# Patient Record
Sex: MALE | Race: WHITE | NOT HISPANIC OR LATINO | Employment: FULL TIME | URBAN - METROPOLITAN AREA
[De-identification: names, ages, dates, MRNs, and addresses within clinical notes are randomized per-mention and may not be internally consistent; named-entity substitution may affect disease eponyms.]

---

## 2018-02-24 ENCOUNTER — NURSE TRIAGE (OUTPATIENT)
Dept: NURSING | Facility: CLINIC | Age: 33
End: 2018-02-24

## 2018-02-24 ENCOUNTER — APPOINTMENT (OUTPATIENT)
Dept: GENERAL RADIOLOGY | Facility: CLINIC | Age: 33
End: 2018-02-24
Attending: PHYSICIAN ASSISTANT
Payer: COMMERCIAL

## 2018-02-24 ENCOUNTER — HOSPITAL ENCOUNTER (EMERGENCY)
Facility: CLINIC | Age: 33
Discharge: HOME | End: 2018-02-24
Attending: PHYSICIAN ASSISTANT | Admitting: PHYSICIAN ASSISTANT
Payer: COMMERCIAL

## 2018-02-24 VITALS
WEIGHT: 260 LBS | OXYGEN SATURATION: 97 % | HEART RATE: 91 BPM | TEMPERATURE: 97 F | BODY MASS INDEX: 35.21 KG/M2 | DIASTOLIC BLOOD PRESSURE: 89 MMHG | SYSTOLIC BLOOD PRESSURE: 153 MMHG | RESPIRATION RATE: 16 BRPM | HEIGHT: 72 IN
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
## 2018-02-24 DIAGNOSIS — S13.4XXA WHIPLASH INJURY TO NECK, INITIAL ENCOUNTER: ICD-10-CM

## 2018-02-24 PROCEDURE — 72040 X-RAY EXAM NECK SPINE 2-3 VW: CPT | Mod: TC

## 2018-02-24 PROCEDURE — 99283 EMERGENCY DEPT VISIT LOW MDM: CPT | Performed by: PHYSICIAN ASSISTANT

## 2018-02-24 PROCEDURE — 99283 EMERGENCY DEPT VISIT LOW MDM: CPT | Mod: Z6 | Performed by: PHYSICIAN ASSISTANT

## 2018-02-24 ASSESSMENT — ENCOUNTER SYMPTOMS
SHORTNESS OF BREATH: 0
NECK PAIN: 1
BACK PAIN: 0
NUMBNESS: 0
HEADACHES: 0
WEAKNESS: 0
ABDOMINAL PAIN: 0
NECK STIFFNESS: 1

## 2018-02-24 NOTE — ED AVS SNAPSHOT
[facility] Emergency Department    [address] [place] [address]    Phone:  [phone]    Fax:  [phone]                                       Vicente Schmidt   MRN: [id number]    Department:  [facility] Emergency Department   Date of Visit:  2/24/2018           After Visit Summary Signature Page     I have received my discharge instructions, and my questions have been answered. I have discussed any challenges I see with this plan with the nurse or doctor.    ..........................................................................................................................................  Patient/Patient Representative Signature      ..........................................................................................................................................  Patient Representative Print Name and Relationship to Patient    ..................................................               ................................................  Date                                            Time    ..........................................................................................................................................  Reviewed by Signature/Title    ...................................................              ..............................................  Date                                                            Time
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
## 2018-02-27 ENCOUNTER — OFFICE VISIT (OUTPATIENT)
Dept: FAMILY MEDICINE | Facility: OTHER | Age: 33
End: 2018-02-27
Payer: COMMERCIAL

## 2018-02-27 VITALS
TEMPERATURE: 98.5 F | DIASTOLIC BLOOD PRESSURE: 76 MMHG | SYSTOLIC BLOOD PRESSURE: 138 MMHG | BODY MASS INDEX: 37.16 KG/M2 | WEIGHT: 274 LBS | HEART RATE: 88 BPM | RESPIRATION RATE: 16 BRPM
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
## 2018-02-27 DIAGNOSIS — S13.9XXD NECK SPRAIN, SUBSEQUENT ENCOUNTER: Primary | ICD-10-CM

## 2018-02-27 DIAGNOSIS — S46.811A STRAIN OF TRAPEZIUS MUSCLE, RIGHT, INITIAL ENCOUNTER: ICD-10-CM

## 2018-02-27 PROCEDURE — 99213 OFFICE O/P EST LOW 20 MIN: CPT | Performed by: PHYSICIAN ASSISTANT

## 2018-02-27 RX ORDER — CYCLOBENZAPRINE HCL 10 MG
10 TABLET ORAL
Qty: 30 TABLET | Refills: 0 | Status: SHIPPED | OUTPATIENT
Start: 2018-02-27 | End: 2018-05-07

## 2018-02-27 ASSESSMENT — PAIN SCALES - GENERAL: PAINLEVEL: MODERATE PAIN (5)
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
## 2018-02-27 NOTE — LETTER
February 27, 2018      Vicente Schmidt  [address] [place] [id number]        To Whom It May Concern:    Vicente Schmidt was seen on February 27, 2018 .  No work until recheck in 1 week.    Sincerely,        Yusra Bahena PA-C
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
## 2018-03-06 ENCOUNTER — OFFICE VISIT (OUTPATIENT)
Dept: FAMILY MEDICINE | Facility: OTHER | Age: 33
End: 2018-03-06
Payer: COMMERCIAL

## 2018-03-06 VITALS
RESPIRATION RATE: 16 BRPM | HEART RATE: 86 BPM | SYSTOLIC BLOOD PRESSURE: 132 MMHG | DIASTOLIC BLOOD PRESSURE: 80 MMHG | WEIGHT: 276.8 LBS | HEIGHT: 73 IN | BODY MASS INDEX: 36.68 KG/M2 | TEMPERATURE: 98.8 F

## 2018-03-06 DIAGNOSIS — S13.9XXD NECK SPRAIN, SUBSEQUENT ENCOUNTER: Primary | ICD-10-CM

## 2018-03-06 PROCEDURE — 99213 OFFICE O/P EST LOW 20 MIN: CPT | Performed by: PHYSICIAN ASSISTANT

## 2018-03-06 ASSESSMENT — PAIN SCALES - GENERAL: PAINLEVEL: MODERATE PAIN (4)
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
## 2018-03-06 NOTE — LETTER
March 6, 2018             Vicente Schmidt  [address] [address] [place] [phone]        To Whom It May Concern:    Vicente Schmidt was seen on March 6, 2018  .  No work until recheck in 1 week.    Sincerely,        Yusra Bahena PA-C

## 2018-03-13 ENCOUNTER — OFFICE VISIT (OUTPATIENT)
Dept: FAMILY MEDICINE | Facility: OTHER | Age: 33
End: 2018-03-13
Payer: COMMERCIAL

## 2018-03-13 VITALS
HEART RATE: 84 BPM | TEMPERATURE: 98.6 F | SYSTOLIC BLOOD PRESSURE: 130 MMHG | RESPIRATION RATE: 16 BRPM | BODY MASS INDEX: 36.58 KG/M2 | WEIGHT: 279 LBS | DIASTOLIC BLOOD PRESSURE: 70 MMHG

## 2018-03-13 DIAGNOSIS — S13.9XXD NECK SPRAIN, SUBSEQUENT ENCOUNTER: Primary | ICD-10-CM

## 2018-03-13 PROCEDURE — 99213 OFFICE O/P EST LOW 20 MIN: CPT | Performed by: PHYSICIAN ASSISTANT

## 2018-03-13 ASSESSMENT — PAIN SCALES - GENERAL: PAINLEVEL: MILD PAIN (2)
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
## 2018-03-28 ENCOUNTER — OFFICE VISIT (OUTPATIENT)
Dept: FAMILY MEDICINE | Facility: OTHER | Age: 33
End: 2018-03-28
Payer: COMMERCIAL

## 2018-03-28 VITALS
SYSTOLIC BLOOD PRESSURE: 126 MMHG | DIASTOLIC BLOOD PRESSURE: 74 MMHG | WEIGHT: 274 LBS | BODY MASS INDEX: 35.93 KG/M2 | TEMPERATURE: 98.8 F | RESPIRATION RATE: 16 BRPM | HEART RATE: 80 BPM

## 2018-03-28 DIAGNOSIS — S13.9XXD NECK SPRAIN, SUBSEQUENT ENCOUNTER: Primary | ICD-10-CM
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
## 2018-03-28 PROCEDURE — 99213 OFFICE O/P EST LOW 20 MIN: CPT | Performed by: PHYSICIAN ASSISTANT

## 2018-03-28 ASSESSMENT — PAIN SCALES - GENERAL: PAINLEVEL: MILD PAIN (3)

## 2018-03-28 NOTE — NURSING NOTE
Form given back to pt to return to HR department. Copy of form sent to scanning.    Joselin Choi CMA ([facility])
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
## 2018-04-09 ENCOUNTER — HOSPITAL ENCOUNTER (OUTPATIENT)
Dept: PHYSICAL THERAPY | Facility: OTHER | Age: 33
Setting detail: THERAPIES SERIES
End: 2018-04-09
Attending: PHYSICIAN ASSISTANT
Payer: COMMERCIAL

## 2018-04-09 PROCEDURE — 97530 THERAPEUTIC ACTIVITIES: CPT | Mod: GP | Performed by: PHYSICAL THERAPIST

## 2018-04-09 PROCEDURE — 40000718 ZZHC STATISTIC PT DEPARTMENT ORTHO VISIT: Performed by: PHYSICAL THERAPIST

## 2018-04-09 PROCEDURE — 97161 PT EVAL LOW COMPLEX 20 MIN: CPT | Mod: GP | Performed by: PHYSICAL THERAPIST

## 2018-04-09 PROCEDURE — 97110 THERAPEUTIC EXERCISES: CPT | Mod: GP | Performed by: PHYSICAL THERAPIST
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
## 2018-04-25 ENCOUNTER — OFFICE VISIT (OUTPATIENT)
Dept: FAMILY MEDICINE | Facility: OTHER | Age: 33
End: 2018-04-25
Payer: COMMERCIAL

## 2018-04-25 VITALS
HEART RATE: 76 BPM | DIASTOLIC BLOOD PRESSURE: 76 MMHG | RESPIRATION RATE: 16 BRPM | BODY MASS INDEX: 35.53 KG/M2 | SYSTOLIC BLOOD PRESSURE: 130 MMHG | TEMPERATURE: 99.5 F | WEIGHT: 271 LBS

## 2018-04-25 DIAGNOSIS — S13.9XXD NECK SPRAIN, SUBSEQUENT ENCOUNTER: Primary | ICD-10-CM

## 2018-04-25 PROCEDURE — 99213 OFFICE O/P EST LOW 20 MIN: CPT | Performed by: PHYSICIAN ASSISTANT

## 2018-04-25 ASSESSMENT — PAIN SCALES - GENERAL: PAINLEVEL: NO PAIN (0)

## 2018-04-25 NOTE — LETTER
April 25, 2018         Vicente Schmidt  [address] [address] [place] [phone]        To Whom It May Concern:    Vicente Schmidt was seen on April 25, 2018  .  He continues to improve as expected and symptoms are nearly resolved.  Restrictions are no longer necessary.        We will see him again in 2-4 weeks to ensure there has been complete resolution of his symptoms.      Sincerely,            Yusra Bahena PA-C
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
## 2018-05-07 ENCOUNTER — OFFICE VISIT (OUTPATIENT)
Dept: FAMILY MEDICINE | Facility: OTHER | Age: 33
End: 2018-05-07
Payer: COMMERCIAL

## 2018-05-07 ENCOUNTER — TELEPHONE (OUTPATIENT)
Dept: FAMILY MEDICINE | Facility: OTHER | Age: 33
End: 2018-05-07

## 2018-05-07 VITALS
WEIGHT: 270 LBS | DIASTOLIC BLOOD PRESSURE: 70 MMHG | SYSTOLIC BLOOD PRESSURE: 128 MMHG | HEART RATE: 80 BPM | BODY MASS INDEX: 35.4 KG/M2 | RESPIRATION RATE: 16 BRPM | TEMPERATURE: 98.5 F

## 2018-05-07 DIAGNOSIS — S13.9XXD NECK SPRAIN, SUBSEQUENT ENCOUNTER: Primary | ICD-10-CM

## 2018-05-07 PROCEDURE — 99212 OFFICE O/P EST SF 10 MIN: CPT | Performed by: PHYSICIAN ASSISTANT

## 2018-05-07 ASSESSMENT — PAIN SCALES - GENERAL: PAINLEVEL: NO PAIN (0)
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
## 2018-05-07 NOTE — MR AVS SNAPSHOT
"              After Visit Summary   2018    Vicente Schmidt    MRN: [id number]           Patient Information     Date Of Birth          1985        Visit Information        Provider Department      2018 4:00 PM Yusra Bahena PA-C [facility]         Follow-ups after your visit        Who to contact     If you have questions or need follow up information about today's clinic visit or your schedule please contact [facility] directly at [phone].  Normal or non-critical lab and imaging results will be communicated to you by MyChart, letter or phone within 4 business days after the clinic has received the results. If you do not hear from us within 7 days, please contact the clinic through [employer] or phone. If you have a critical or abnormal lab result, we will notify you by phone as soon as possible.  Submit refill requests through [employer] or call your pharmacy and they will forward the refill request to us. Please allow 3 business days for your refill to be completed.          Additional Information About Your Visit        [facility] Information     [employer] lets you send messages to your doctor, view your test results, renew your prescriptions, schedule appointments and more. To sign up, go to [email or web address] . Click on \"Log in\" on the left side of the screen, which will take you to the Welcome page. Then click on \"Sign up Now\" on the right side of the page.     You will be asked to enter the access code listed below, as well as some personal information. Please follow the directions to create your username and password.     Your access code is: [email or web address]  Expires: 2018  7:32 PM     Your access code will  in 90 days. If you need help or a new code, please call your [facility] or [phone].        Care EveryWhere ID     This is your Care EveryWhere ID. This could be used by other organizations to access your [place] medical " records  [id number]        Your Vitals Were     Pulse Temperature Respirations BMI (Body Mass Index)          80 98.5  F (36.9  C) (Temporal) 16 35.4 kg/m2         Blood Pressure from Last 3 Encounters:   05/07/18 128/70   04/25/18 130/76   03/28/18 126/74    Weight from Last 3 Encounters:   05/07/18 270 lb (122.5 kg)   04/25/18 271 lb (122.9 kg)   03/28/18 274 lb (124.3 kg)              Today, you had the following     No orders found for display       Primary Care Provider Office Phone # Fax #    Yusra Bahena PA-C [phone] [address] [place] [address]        Equal Access to Services     JOAQUÍN URBINA : Parmjit Singleton, waaxda luqadaha, qaybta kaalmada pawelyareinaldo, daniel hayward . So [facility] [phone].    ATENCIÓN: Si habla español, tiene a solis disposición servicios gratuitos de asistencia lingüística. Llame al [phone].    We comply with applicable federal civil rights laws and [place] laws. We do not discriminate on the basis of race, color, national origin, age, disability, sex, sexual orientation, or gender identity.            Thank you!     Thank you for choosing [facility]  for your care. Our goal is always to provide you with excellent care. Hearing back from our patients is one way we can continue to improve our services. Please take a few minutes to complete the written survey that you may receive in the mail after your visit with us. Thank you!             Your Updated Medication List - Protect others around you: Learn how to safely use, store and throw away your medicines at www.disposemymeds.org.      Notice  As of 5/7/2018  4:15 PM    You have not been prescribed any medications.

## 2018-05-07 NOTE — LETTER
May 7, 2018         Vicente Schmidt  [address] [address] [place] [id number]        To Whom It May Concern:    Vicente Schmidt was seen on May 7, 2018  . All pain has resolved. Restrictions are no longer necessary.   There is no need for further appointments as he has had resolution of all his pain.            Sincerely,            Yusra Bahena PA-C
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
## 2018-09-24 NOTE — ADDENDUM NOTE
Encounter addended by: Anat Rausch, PT on: 9/24/2018  8:40 AM<BR>     Actions taken: Episode resolved, Sign clinical note

## 2018-12-28 ENCOUNTER — OFFICE VISIT (OUTPATIENT)
Dept: FAMILY MEDICINE | Facility: CLINIC | Age: 33
End: 2018-12-28
Payer: COMMERCIAL

## 2018-12-28 VITALS
SYSTOLIC BLOOD PRESSURE: 134 MMHG | WEIGHT: 275 LBS | RESPIRATION RATE: 16 BRPM | DIASTOLIC BLOOD PRESSURE: 82 MMHG | HEART RATE: 73 BPM | BODY MASS INDEX: 36.06 KG/M2 | TEMPERATURE: 99.1 F | OXYGEN SATURATION: 99 %

## 2018-12-28 DIAGNOSIS — J06.9 UPPER RESPIRATORY TRACT INFECTION, UNSPECIFIED TYPE: ICD-10-CM

## 2018-12-28 DIAGNOSIS — J02.9 SORETHROAT: Primary | ICD-10-CM

## 2018-12-28 DIAGNOSIS — H11.32 SUBCONJUNCTIVAL HEMORRHAGE OF LEFT EYE: ICD-10-CM

## 2018-12-28 LAB
DEPRECATED S PYO AG THROAT QL EIA: NORMAL
SPECIMEN SOURCE: NORMAL

## 2018-12-28 PROCEDURE — 87880 STREP A ASSAY W/OPTIC: CPT | Performed by: PHYSICIAN ASSISTANT

## 2018-12-28 PROCEDURE — 99214 OFFICE O/P EST MOD 30 MIN: CPT | Performed by: PHYSICIAN ASSISTANT

## 2018-12-28 PROCEDURE — 87081 CULTURE SCREEN ONLY: CPT | Performed by: PHYSICIAN ASSISTANT

## 2018-12-28 RX ORDER — BENZONATATE 200 MG/1
200 CAPSULE ORAL 3 TIMES DAILY PRN
Qty: 30 CAPSULE | Refills: 0 | Status: SHIPPED | OUTPATIENT
Start: 2018-12-28 | End: 2019-05-13

## 2018-12-28 RX ORDER — CETIRIZINE HYDROCHLORIDE 10 MG/1
10 TABLET ORAL DAILY
Qty: 90 TABLET | Refills: 3 | Status: SHIPPED | OUTPATIENT
Start: 2018-12-28 | End: 2019-05-13

## 2018-12-28 ASSESSMENT — ENCOUNTER SYMPTOMS
HEMOPTYSIS: 0
CARDIOVASCULAR NEGATIVE: 1
DIAPHORESIS: 0
WHEEZING: 0
SHORTNESS OF BREATH: 0
PALPITATIONS: 0
COUGH: 1
GASTROINTESTINAL NEGATIVE: 1
BLURRED VISION: 0
SINUS PAIN: 0
CONSTITUTIONAL NEGATIVE: 1
DOUBLE VISION: 0
SORE THROAT: 1
WEIGHT LOSS: 0
SPUTUM PRODUCTION: 1
EYE PAIN: 0
PHOTOPHOBIA: 0
FEVER: 0
EYE REDNESS: 1
EYE DISCHARGE: 0

## 2018-12-28 ASSESSMENT — PAIN SCALES - GENERAL: PAINLEVEL: MODERATE PAIN (4)

## 2018-12-28 NOTE — LETTER
12/30/2018     Vicente Schmidt  [address] [address] [place] [phone]        Dear Vicente Laguerre,    Your 24 hour strep culture came back negative.  Please call the clinic at [phone] if you have any other questions or concerns.        Sincerely,    Samantha See KAPIL Lindsay

## 2018-12-28 NOTE — PROGRESS NOTES
"  SUBJECTIVE:   Vicente Schmidt is a 33 year old male who presents to clinic today for the following health issues:      Patient presents with:  Pharyngitis: pt c/o sore throat in the morning, post nasal drainage  Eye Problem: pt c/o red spot on left eye since 12/25/18        {additional problems for provider to add:656912}    Problem list and histories reviewed & adjusted, as indicated.  Additional history: {NONE - AS DOCUMENTED:874244::\"as documented\"}    {HIST REVIEW/ LINKS 2:351431}    Reviewed and updated as needed this visit by clinical staff  Tobacco  Allergies  Meds  Med Hx  Surg Hx  Fam Hx  Soc Hx      Reviewed and updated as needed this visit by Provider         {PROVIDER CHARTING PREFERENCE:545411}  "

## 2018-12-28 NOTE — PROGRESS NOTES
SUBJECTIVE:     HPI  Vicente Schmidt is a 33 year old male who presents to clinic today for the following health issues:  RESPIRATORY SYMPTOMS    Duration: 3days    Description  nasal congestion, rhinorrhea, sore throat, cough and L eye redness    Severity: mild    Accompanying signs and symptoms: Productive cough but no hemoptysis, shortness of breath or wheezing.  Reports ST and post nasal drainage that is worse in the morning but no sinus pain/pressure/HA.  No fever, chills or sweats.  No eye drainage or changes in his vision.  No HA, dizziness or n/v.  No trauma or injuries.     History (predisposing factors):  No ill contacts.  No pmh of asthma.  Non-smoker.    Precipitating or alleviating factors: None    Therapies tried and outcome:  rest and fluids and eye drops with some relief    Reviewed PMH, FMH and SOH.  Patient Active Problem List   Diagnosis     Pilonidal cyst with abscess     CARDIOVASCULAR SCREENING; LDL GOAL LESS THAN 160     No current outpatient medications on file.     No Known Allergies    Review of Systems   Constitutional: Negative.  Negative for diaphoresis, fever and weight loss.   HENT: Positive for congestion and sore throat. Negative for sinus pain.    Eyes: Positive for redness. Negative for blurred vision, double vision, photophobia, pain and discharge.   Respiratory: Positive for cough and sputum production. Negative for hemoptysis, shortness of breath and wheezing.    Cardiovascular: Negative.  Negative for chest pain and palpitations.   Gastrointestinal: Negative.    Skin: Negative.    All other systems reviewed and are negative.      /82   Pulse 73   Temp 99.1  F (37.3  C) (Oral)   Resp 16   Wt 124.7 kg (275 lb)   SpO2 99%   BMI 36.06 kg/m    Physical Exam   Constitutional: He is oriented to person, place, and time. He appears well-developed and well-nourished. No distress.   HENT:   Head: Normocephalic and atraumatic.   Nose: Mucosal edema and rhinorrhea present. No  nasal deformity or septal deviation. No epistaxis.  No foreign bodies. Right sinus exhibits no maxillary sinus tenderness and no frontal sinus tenderness. Left sinus exhibits no maxillary sinus tenderness and no frontal sinus tenderness.   Mouth/Throat: Uvula is midline and mucous membranes are normal. No oropharyngeal exudate, posterior oropharyngeal edema, posterior oropharyngeal erythema or tonsillar abscesses.   TMs are intact without any erythema or bulging bilaterally.  Airway is patent.   Eyes: EOM and lids are normal. Pupils are equal, round, and reactive to light. Lids are everted and swept, no foreign bodies found. Right eye exhibits no discharge. No foreign body present in the right eye. Left eye exhibits no discharge. No foreign body present in the left eye. Right conjunctiva is not injected. Right conjunctiva has no hemorrhage. Left conjunctiva is not injected. Left conjunctiva has a hemorrhage. No scleral icterus.   Visual acuity L eye 20/20.  Visual acuity R eye 20/20.     Neck: Normal range of motion. Neck supple. No thyromegaly present.   Cardiovascular: Normal rate, regular rhythm, normal heart sounds and intact distal pulses. Exam reveals no gallop and no friction rub.   No murmur heard.  Pulmonary/Chest: Effort normal and breath sounds normal. No respiratory distress. He has no wheezes. He has no rales.   Abdominal: Soft. Normal appearance, normal aorta and bowel sounds are normal. He exhibits no mass. There is no hepatosplenomegaly. There is no tenderness. There is no rebound and no guarding.   Lymphadenopathy:     He has no cervical adenopathy.   Neurological: He is alert and oriented to person, place, and time.   Skin: Skin is warm and dry. No rash noted.   Psychiatric: He has a normal mood and affect. Judgment normal.   Nursing note and vitals reviewed.        Assessment/Plan:  Sorethroat:  RST is negative, will send for throat culture.  Sounds more like post nasal drainage.  Will give  zyrtec.  Recommend tylenol/ibuprofen prn pain/fever, warm salt water gargles, lozenges or cough drops.  Recheck in clinic if symptoms worsen or if symptoms do not improve.    -     Strep, Rapid Screen  -     Beta strep group A culture  -     cetirizine (ZYRTEC) 10 MG tablet; Take 1 tablet (10 mg) by mouth daily    Subconjunctival hemorrhage of left eye:  No evidence of infection or red flags.  Reassurance and will resolve in its own.  To the ER if he develops worsening pain, changes in his vision, HA, dizziness, n/v.    Upper respiratory tract infection, unspecified type: Will treat with  tessalon perles as needed for cough.  Recommend treatment with rest, fluids and chicken soup. Tylenol/ibuprofen prn fever/pain.  To the ER if he develops hemoptysis, chest pain, fevers>102, worsening shortness of breath/wheezing.    -     benzonatate (TESSALON) 200 MG capsule; Take 1 capsule (200 mg) by mouth 3 times daily as needed for cough          Samantha Lindsay PA-C

## 2018-12-29 LAB
BACTERIA SPEC CULT: NORMAL
SPECIMEN SOURCE: NORMAL

## 2019-05-13 ENCOUNTER — OFFICE VISIT (OUTPATIENT)
Dept: FAMILY MEDICINE | Facility: OTHER | Age: 34
End: 2019-05-13
Payer: COMMERCIAL

## 2019-05-13 VITALS
RESPIRATION RATE: 16 BRPM | HEIGHT: 74 IN | OXYGEN SATURATION: 98 % | TEMPERATURE: 98.9 F | DIASTOLIC BLOOD PRESSURE: 82 MMHG | WEIGHT: 287 LBS | BODY MASS INDEX: 36.83 KG/M2 | SYSTOLIC BLOOD PRESSURE: 126 MMHG | HEART RATE: 70 BPM
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
## 2019-05-13 DIAGNOSIS — S16.1XXD STRAIN OF NECK MUSCLE, SUBSEQUENT ENCOUNTER: ICD-10-CM

## 2019-05-13 PROCEDURE — 99213 OFFICE O/P EST LOW 20 MIN: CPT | Performed by: PHYSICIAN ASSISTANT

## 2019-05-13 RX ORDER — CYCLOBENZAPRINE HCL 10 MG
10 TABLET ORAL 3 TIMES DAILY PRN
COMMUNITY
End: 2020-10-19

## 2019-05-13 ASSESSMENT — MIFFLIN-ST. JEOR: SCORE: 2318.63

## 2019-05-13 ASSESSMENT — PAIN SCALES - GENERAL: PAINLEVEL: MILD PAIN (3)

## 2019-05-13 NOTE — LETTER
May 13, 2019      Vicente Schmidt  [address] [address] [phone]        To Whom It May Concern:    Vicente Schmidt was seen on 5/13/2019. 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 This is effective until 5/20/2019. He can then return back to normal duties as long as feeling well.         Sincerely,        Alicia Sanchez PA-C
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
## 2020-09-10 ENCOUNTER — OFFICE VISIT (OUTPATIENT)
Dept: FAMILY MEDICINE | Facility: CLINIC | Age: 35
End: 2020-09-10
Payer: COMMERCIAL

## 2020-09-10 VITALS
OXYGEN SATURATION: 98 % | SYSTOLIC BLOOD PRESSURE: 126 MMHG | RESPIRATION RATE: 12 BRPM | DIASTOLIC BLOOD PRESSURE: 84 MMHG | WEIGHT: 280.9 LBS | HEART RATE: 72 BPM | TEMPERATURE: 98.2 F | BODY MASS INDEX: 36.56 KG/M2

## 2020-09-10 DIAGNOSIS — R07.0 THROAT PAIN: Primary | ICD-10-CM

## 2020-09-10 LAB
DEPRECATED S PYO AG THROAT QL EIA: NEGATIVE
SPECIMEN SOURCE: NORMAL
SPECIMEN SOURCE: NORMAL
STREP GROUP A PCR: NOT DETECTED

## 2020-09-10 PROCEDURE — 40001204 ZZHCL STATISTIC STREP A RAPID: Performed by: FAMILY MEDICINE

## 2020-09-10 PROCEDURE — 87651 STREP A DNA AMP PROBE: CPT | Performed by: FAMILY MEDICINE

## 2020-09-10 PROCEDURE — 99213 OFFICE O/P EST LOW 20 MIN: CPT | Performed by: FAMILY MEDICINE

## 2020-09-10 ASSESSMENT — PAIN SCALES - GENERAL: PAINLEVEL: MILD PAIN (2)

## 2020-09-10 NOTE — PROGRESS NOTES
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  Just started yesterday was a little worse this morning.  He has had no fever.  Slightly tender adenopathy on the right side.  No known exposures.  No real cough.    History reviewed. No pertinent past medical history.  History reviewed. No pertinent surgical history.  Social History     Tobacco Use     Smoking status: Former Smoker     Smokeless tobacco: Never Used   Substance Use Topics     Alcohol use: Yes     Comment: occ     Current Outpatient Medications   Medication Sig Dispense Refill     cyclobenzaprine (FLEXERIL) 10 MG tablet Take 10 mg by mouth 3 times daily as needed for muscle spasms         REVIEW OF SYSTEMS:   5 point ROS negative except as noted above in HPI, including Gen., Resp, CV, GI &  system review.     OBJECTIVE:  Vitals: /84   Pulse 72   Temp 98.2  F (36.8  C) (Temporal)   Resp 12   Wt 127.4 kg (280 lb 14.4 oz)   SpO2 98%   BMI 36.56 kg/m    BMI= Body mass index is 36.56 kg/m .  He is alert appears in no distress.  His throat shows some enlarged tonsillar area on the left a little bit reddened.  No exudate seen.  Neck is supple just some shotty anterior nodes nothing remarkable.  Ears are clear.  Skin clear.  Lungs are clear to auscultation.   Rapid strep is negative.    ASSESSMENT:  Pharyngitis    PLAN:  Reassured him we will notify him if the culture should change results at all.  Otherwise follow-up if not improving.        Ambrosio Hernandez MD  [facility]

## 2020-09-10 NOTE — LETTER
[address] [address]  Phone: [phone]  Fax: [phone]    September 10, 2020        Vicente Schmidt  [address] [address] [place] [id number]          To whom it may concern:    RE: Vicente Schmidt    Patient was seen and treated today at our clinic and missed work.  He may return to work Monday, September 14 with no restrictions.    Please contact me for questions or concerns.      Sincerely,        Ambrosio Hernandez MD

## 2020-10-18 ENCOUNTER — NURSE TRIAGE (OUTPATIENT)
Dept: NURSING | Facility: CLINIC | Age: 35
End: 2020-10-18

## 2020-10-18 NOTE — TELEPHONE ENCOUNTER
Patient is having tightness in the chest on the right side. Patient has a cough. He does not feel short of breath, no fever. He feels like he has a cold. Cough is productive, phlegm not visualized. NO wheezing.   COVID 19 Nurse Triage Plan/Patient Instructions    Please be aware that novel coronavirus (COVID-19) may be circulating in the community. If you develop symptoms such as fever, cough, or SOB or if you have concerns about the presence of another infection including coronavirus (COVID-19), please contact your health care provider or visit [email or web address].     Disposition/Instructions    Virtual Visit with provider recommended. Reference Visit Selection Guide.    Thank you for taking steps to prevent the spread of this virus.  o Limit your contact with others.  o Wear a simple mask to cover your cough.  o Wash your hands well and often.    Resources    [facility]: About COVID-19: [email or web address]    CDC: What to Do If You're Sick: www.cdc.gov/coronavirus/2019-ncov/about/steps-when-sick.html    CDC: Ending Home Isolation: www.cdc.gov/coronavirus/2019-ncov/hcp/disposition-in-home-patients.html     CDC: Caring for Someone: www.cdc.gov/coronavirus/2019-ncov/if-you-are-sick/care-for-someone.html     [facility]: Interim Guidance for Hospital Discharge to Home: www.health.Counts include 234 beds at the [facility].mn.[place]/diseases/coronavirus/hcp/hospdischarge.pdf    [facility] clinical trials (COVID-19 research studies): clinicalaffairs.[facility]-clinical-trials     Below are the COVID-19 hotlines at the [place] [facility] of [facility] ([facility]). Interpreters are available.   o For health questions: Call 479-226-7339 or 1-255.788.9064 (7 a.m. to 7 p.m.)  o For questions about schools and childcare: Call 350-931-4758 or 1-743.414.9079 (7 a.m. to 7 p.m.)                       Reason for Disposition    [1] COVID-19 infection suspected by caller or triager AND [2] mild symptoms (cough, fever, or others) AND [3] no complications or  SOB    Additional Information    Negative: SEVERE difficulty breathing (e.g., struggling for each breath, speaks in single words)    Negative: Difficult to awaken or acting confused (e.g., disoriented, slurred speech)    Negative: Bluish (or gray) lips or face now    Negative: Shock suspected (e.g., cold/pale/clammy skin, too weak to stand, low BP, rapid pulse)    Negative: Sounds like a life-threatening emergency to the triager    Negative: SEVERE or constant chest pain or pressure (Exception: mild central chest pain, present only when coughing)    Negative: MODERATE difficulty breathing (e.g., speaks in phrases, SOB even at rest, pulse 100-120)    Negative: [1] COVID-19 exposure AND [2] no symptoms    Negative: COVID-19 and Breastfeeding, questions about    Negative: [1] Adult with possible COVID-19 symptoms AND [2] triager concerned about severity of symptoms or other causes    Negative: Patient sounds very sick or weak to the triager    Negative: MILD difficulty breathing (e.g., minimal/no SOB at rest, SOB with walking, pulse <100)    Negative: Chest pain or pressure    Negative: Fever > 103 F (39.4 C)    Negative: [1] Fever > 101 F (38.3 C) AND [2] age > 60    Negative: [1] Fever > 100.0 F (37.8 C) AND [2] bedridden (e.g., nursing home patient, CVA, chronic illness, recovering from surgery)    Negative: HIGH RISK patient (e.g., age > 64 years, diabetes, heart or lung disease, weak immune system) (Exception: Has already been evaluated by healthcare provider and has no new or worsening symptoms)    Negative: Fever present > 3 days (72 hours)    Negative: [1] Fever returns after gone for over 24 hours AND [2] symptoms worse or not improved    Negative: [1] Continuous (nonstop) coughing interferes with work or school AND [2] no improvement using cough treatment per protocol    Protocols used: CORONAVIRUS (COVID-19) DIAGNOSED OR [id number] 8.4.20

## 2020-10-19 ENCOUNTER — VIRTUAL VISIT (OUTPATIENT)
Dept: FAMILY MEDICINE | Facility: CLINIC | Age: 35
End: 2020-10-19
Payer: COMMERCIAL

## 2020-10-19 DIAGNOSIS — Z20.822 SUSPECTED 2019 NOVEL CORONAVIRUS INFECTION: Primary | ICD-10-CM

## 2020-10-19 PROCEDURE — 99213 OFFICE O/P EST LOW 20 MIN: CPT | Mod: 95 | Performed by: FAMILY MEDICINE

## 2020-10-19 NOTE — PROGRESS NOTES
"Vicente Schmidt is a 35 year old male who is being evaluated via a billable telephone visit.      The patient has been notified of following:     \"This telephone visit will be conducted via a call between you and your physician/provider. We have found that certain health care needs can be provided without the need for a physical exam.  This service lets us provide the care you need with a short phone conversation.  If a prescription is necessary we can send it directly to your pharmacy.  If lab work is needed we can place an order for that and you can then stop by our lab to have the test done at a later time.    Telephone visits are billed at different rates depending on your insurance coverage. During this emergency period, for some insurers they may be billed the same as an in-person visit.  Please reach out to your insurance provider with any questions.    If during the course of the call the physician/provider feels a telephone visit is not appropriate, you will not be charged for this service.\"    Patient has given verbal consent for Telephone visit?  Yes    What phone number would you like to be contacted at? [phone]    How would you like to obtain your AVS? Mail a copy    Subjective     Vicente Schmdit is a 35 year old male who presents via phone visit today for the following health issues:    Cough, phelgm, was hunting in [place]. Hasn't been back to work. Wondering about COVID symptoms.  Has not been swabbed for COVID.     HPI  Patient has generally minor cold-like symptoms except for what he would describe as a chest tightness.  He is aware this may be COVID and contacted someone yesterday.  He was told to contact us today to get testing arranged.  He understands he needs to be isolating during this time.      Review of Systems   Constitutional, HEENT, cardiovascular, pulmonary, gi and gu systems are negative, except as otherwise noted.       Objective          Vitals:  No vitals were obtained today due " to virtual visit.    healthy, alert and no distress  PSYCH: Alert and oriented times 3; coherent speech, normal   rate and volume, able to articulate logical thoughts, able   to abstract reason, no tangential thoughts, no hallucinations   or delusions  His affect is normal  RESP: No cough, no audible wheezing, able to talk in full sentences  Remainder of exam unable to be completed due to telephone visits    Awaiting COVID testing        Assessment/Plan:    Assessment & Plan     Suspected 2019 novel coronavirus infection  He would be having a minor course so far if this is COVID however these of the kind of people that can spread it.  He understands he needs to self isolate/quarantine and have testing before he returns to work or becomes more active around other people.  Testing is ordered.  Work note is generated  - Symptomatic COVID-19 Virus (Coronavirus) by PCR; Future        See Patient Instructions    Return if symptoms worsen or fail to improve.    Diego Noland MD  [facility]    Phone call duration:  11 minutes 9 AM to 9:11 AM

## 2020-10-19 NOTE — LETTER
[address] [address]  Phone: [phone]  Fax: [phone]    October 19, 2020        Vicente Schmidt  [address] [facility] [address]          To whom it may concern:    RE: Vicente Schmidt    The above named individual has COVID-like symptoms and it is my recommendation that he not return to work until we determine if he has COVID present.  We would anticipate testing in the next 1 to 2 days and report of the test in another 1 to 2 days.  It is my recommendation that he be absent from work and self isolate/quarantine until at least Thursday unless we have a negative test reported before that time.  This quarantine may last another day or 2 depending upon turnaround time of testing.    Please contact me for questions or concerns.      Sincerely,        Diego Noland MD  Electronically signed with authenticity to be confirmed by contacting above number

## 2020-10-19 NOTE — Clinical Note
I completed a work note and it should be printed and sent with his AVS.  I also ordered COVID testing and hopefully somebody from the center for scheduling test calls him today.  He was instructed to self isolate until we know the results.

## 2020-10-19 NOTE — PATIENT INSTRUCTIONS
1.  Covid test is ordered and anticipate someone to call later today to schedule.  If you have not heard by 3 PM, please contact the clinic to see if we can facilitate a more rapid scheduling.  2.  Quarantine/self isolate so that others are not at risk.  This means basically staying at home and not having much contact with others except family with whom you may already have had contact.  3.  Breathing get worse or you struggle more before we have results of testing, keep us informed.  4.  The letter accompanying this indicates that you may be absent from work until October 22.  It may be that we need to extend this and this is noted in the letter to some degree.  Should you need another work note or more documentation please let us know.

## 2020-10-20 DIAGNOSIS — Z20.822 SUSPECTED 2019 NOVEL CORONAVIRUS INFECTION: ICD-10-CM

## 2020-10-20 PROCEDURE — U0003 INFECTIOUS AGENT DETECTION BY NUCLEIC ACID (DNA OR RNA); SEVERE ACUTE RESPIRATORY SYNDROME CORONAVIRUS 2 (SARS-COV-2) (CORONAVIRUS DISEASE [COVID-19]), AMPLIFIED PROBE TECHNIQUE, MAKING USE OF HIGH THROUGHPUT TECHNOLOGIES AS DESCRIBED BY CMS-2020-01-R: HCPCS | Performed by: FAMILY MEDICINE

## 2020-10-21 LAB
SARS-COV-2 RNA SPEC QL NAA+PROBE: NOT DETECTED
SPECIMEN SOURCE: NORMAL

## 2020-10-22 ENCOUNTER — TELEPHONE (OUTPATIENT)
Dept: FAMILY MEDICINE | Facility: CLINIC | Age: 35
End: 2020-10-22

## 2020-10-22 NOTE — TELEPHONE ENCOUNTER
----- Message from Diego Noland MD sent at 10/22/2020  4:27 PM CDT -----  Please call with results. Your test results fall within the expected range(s) or remain unchanged from previous results.  Please continue with your current treatment plan. It is unlikely but not impossible symptoms are COVID so he should follow up as needed.     Diego Noland MD

## 2021-05-05 ENCOUNTER — IMMUNIZATION (OUTPATIENT)
Dept: FAMILY MEDICINE | Facility: CLINIC | Age: 36
End: 2021-05-05
Payer: COMMERCIAL

## 2021-05-05 PROCEDURE — 91301 PR COVID VAC MODERNA 100 MCG/0.5 ML IM: CPT

## 2021-05-05 PROCEDURE — 0011A PR COVID VAC MODERNA 100 MCG/0.5 ML IM: CPT

## 2021-06-02 ENCOUNTER — IMMUNIZATION (OUTPATIENT)
Dept: FAMILY MEDICINE | Facility: CLINIC | Age: 36
End: 2021-06-02
Attending: FAMILY MEDICINE
Payer: COMMERCIAL

## 2021-06-02 PROCEDURE — 0012A PR COVID VAC MODERNA 100 MCG/0.5 ML IM: CPT

## 2021-06-02 PROCEDURE — 91301 PR COVID VAC MODERNA 100 MCG/0.5 ML IM: CPT

## 2022-04-18 ENCOUNTER — OFFICE VISIT (OUTPATIENT)
Dept: FAMILY MEDICINE | Facility: CLINIC | Age: 37
End: 2022-04-18
Payer: COMMERCIAL

## 2022-04-18 VITALS
DIASTOLIC BLOOD PRESSURE: 72 MMHG | SYSTOLIC BLOOD PRESSURE: 128 MMHG | WEIGHT: 250 LBS | HEART RATE: 90 BPM | BODY MASS INDEX: 32.54 KG/M2 | OXYGEN SATURATION: 98 % | RESPIRATION RATE: 10 BRPM | TEMPERATURE: 98 F

## 2022-04-18 DIAGNOSIS — Z11.3 SCREEN FOR STD (SEXUALLY TRANSMITTED DISEASE): ICD-10-CM

## 2022-04-18 DIAGNOSIS — L05.91 CHRONIC RECURRENT PILONIDAL CYST: Primary | ICD-10-CM

## 2022-04-18 PROCEDURE — 87591 N.GONORRHOEAE DNA AMP PROB: CPT | Performed by: FAMILY MEDICINE

## 2022-04-18 PROCEDURE — 99214 OFFICE O/P EST MOD 30 MIN: CPT | Performed by: FAMILY MEDICINE

## 2022-04-18 PROCEDURE — 36415 COLL VENOUS BLD VENIPUNCTURE: CPT | Performed by: FAMILY MEDICINE

## 2022-04-18 PROCEDURE — 86803 HEPATITIS C AB TEST: CPT | Performed by: FAMILY MEDICINE

## 2022-04-18 PROCEDURE — 87491 CHLMYD TRACH DNA AMP PROBE: CPT | Performed by: FAMILY MEDICINE

## 2022-04-18 PROCEDURE — 87389 HIV-1 AG W/HIV-1&-2 AB AG IA: CPT | Performed by: FAMILY MEDICINE

## 2022-04-18 RX ORDER — CEPHALEXIN 500 MG/1
500 CAPSULE ORAL 4 TIMES DAILY
Qty: 40 CAPSULE | Refills: 1 | Status: SHIPPED | OUTPATIENT
Start: 2022-04-18

## 2022-04-18 NOTE — PROGRESS NOTES
Assessment & Plan     Chronic recurrent pilonidal cyst  Had trouble with this before was draining a while back now it settled down.  Keflex has helped him.  Put him on 10 days.  But he was told he may need surgical intervention and is looking for an opinion there.  Insisting above the area that is indurated and where was draining there is a 1 cm hemorrhagic polyp like structure.  - cephALEXin (KEFLEX) 500 MG capsule; Take 1 capsule (500 mg) by mouth 4 times daily  - Adult General Surg Referral; Future    Screen for STD (sexually transmitted disease)  No symptoms no known contacts but he has had a change in his relationship.  - HIV Antigen Antibody Combo; Future  - Hepatitis C antibody; Future  - NEISSERIA GONORRHOEA PCR; Future  - CHLAMYDIA TRACHOMATIS PCR; Future  - HIV Antigen Antibody Combo  - Hepatitis C antibody  - NEISSERIA GONORRHOEA PCR  - CHLAMYDIA TRACHOMATIS PCR                 No follow-ups on file.    Ambrosio Hernandez MD  [facility] SAW Zhang is a 36 year old who presents for the following health issues     History of Present Illness       Reason for visit:  Sist  Symptom onset:  More than a month  Symptom intensity:  Mild  Symptom progression:  Staying the same  Had these symptoms before:  Yes  Has tried/received treatment for these symptoms:  Yes  Previous treatment was successful:  Yes  Prior treatment description:  Pills  What makes it worse:  Sitting to long  What makes it better:  Standing    He eats 4 or more servings of fruits and vegetables daily.He consumes 2 sweetened beverage(s) daily.He exercises with enough effort to increase his heart rate 10 to 19 minutes per day.  He exercises with enough effort to increase his heart rate 3 or less days per week.   He is taking medications regularly.     Also would like STD testing, no signs or symptoms per patient.          Review of Systems   Constitutional, HEENT, cardiovascular, pulmonary, gi and gu systems  are negative, except as otherwise noted.      Objective    /72   Pulse 90   Temp 98  F (36.7  C)   Resp 10   Wt 113.4 kg (250 lb)   SpO2 98%   BMI 32.54 kg/m    Body mass index is 32.54 kg/m .  Physical Exam   GENERAL: healthy, alert and no distress  MS: no gross musculoskeletal defects noted, no edema  SKIN: Top of the gluteal cleft there is an indurated area not red or warm at this time with 1 mm opening that has no drainage right now.  Superior to this is a 1 cm hemorrhagic looking polyp that is not tender.  BACK: no CVA tenderness, no paralumbar tenderness  PSYCH: mentation appears normal, affect normal/bright

## 2022-04-19 LAB
C TRACH DNA SPEC QL NAA+PROBE: NEGATIVE
HCV AB SERPL QL IA: NONREACTIVE
HIV 1+2 AB+HIV1 P24 AG SERPL QL IA: NONREACTIVE
N GONORRHOEA DNA SPEC QL NAA+PROBE: NEGATIVE

## 2022-04-28 ENCOUNTER — OFFICE VISIT (OUTPATIENT)
Dept: SURGERY | Facility: CLINIC | Age: 37
End: 2022-04-28
Attending: FAMILY MEDICINE
Payer: COMMERCIAL

## 2022-04-28 VITALS
DIASTOLIC BLOOD PRESSURE: 68 MMHG | TEMPERATURE: 96.2 F | BODY MASS INDEX: 33.13 KG/M2 | WEIGHT: 250 LBS | SYSTOLIC BLOOD PRESSURE: 116 MMHG | HEIGHT: 73 IN

## 2022-04-28 DIAGNOSIS — L05.91 CHRONIC RECURRENT PILONIDAL CYST: ICD-10-CM

## 2022-04-28 PROCEDURE — 99243 OFF/OP CNSLTJ NEW/EST LOW 30: CPT | Performed by: SURGERY

## 2022-04-28 ASSESSMENT — PAIN SCALES - GENERAL: PAINLEVEL: NO PAIN (0)

## 2022-04-28 NOTE — LETTER
4/28/2022         RE: Vicente Schmidt  [address] [facility] [address]        Dear Colleague,    Thank you for referring your patient, Vicente Schmidt, to the [facility]. Please see a copy of my visit note below.    Patient seen in consultation for pilonidal disease    HPI:  Patient is a 36 year old male with history of swelling, pain and subsequent drainage of bloody purulent drainage from gluteal cleft. He had something similar 10+ years ago. Nothing again until now. He was given antibiotics and they have greatly reduced the swelling. It is no longer draining. Does not currently hurt. Does not affect bowels or bladder.    Review Of Systems    Skin: as above  Ears/Nose/Throat: negative  Respiratory: No shortness of breath, dyspnea on exertion, cough, or hemoptysis  Cardiovascular: negative  Gastrointestinal: negative  Genitourinary: negative  Musculoskeletal: negative  Neurologic: negative  Hematologic/Lymphatic/Immunologic: negative  Endocrine: negative      No past medical history on file.    No past surgical history on file.    Family History   Problem Relation Age of Onset     Hypertension Father      Cardiovascular Father         high chol       Social History     Socioeconomic History     Marital status: Single     Spouse name: Not on file     Number of children: Not on file     Years of education: Not on file     Highest education level: Not on file   Occupational History     Not on file   Tobacco Use     Smoking status: Former Smoker     Smokeless tobacco: Never Used   Substance and Sexual Activity     Alcohol use: Yes     Comment: occ     Drug use: No     Sexual activity: Not Currently     Partners: Female   Other Topics Concern     Parent/sibling w/ CABG, MI or angioplasty before 65F 55M? Not Asked   Social History Narrative     Not on file     Social Determinants of Health     Financial Resource Strain: Not on file   Food Insecurity: Not on file   Transportation Needs:  "Not on file   Physical Activity: Not on file   Stress: Not on file   Social Connections: Not on file   Intimate Partner Violence: Not on file   Housing Stability: Not on file       Current Outpatient Medications   Medication Sig Dispense Refill     cephALEXin (KEFLEX) 500 MG capsule Take 1 capsule (500 mg) by mouth 4 times daily 40 capsule 1       Medications and history reviewed    Physical exam:  Vitals: /68   Temp (!) 96.2  F (35.7  C) (Temporal)   Ht 1.854 m (6' 1\")   Wt 113.4 kg (250 lb)   BMI 32.98 kg/m    BMI= Body mass index is 32.98 kg/m .    Constitutional: Healthy, alert, non-distressed   Head: Normo-cephalic, atraumatic, no lesions, masses or tenderness   Cardiovascular: RRR, no new murmurs, +S1, +S2 heart sounds, no clicks, rubs or gallops   Respiratory: CTAB, no rales, rhonchi or wheezing, equal chest rise, good respiratory effort   Gastrointestinal: Soft, non-tender, non distended, no rebound rigidity or guarding, no masses or hernias palpated   : Deferred  Musculoskeletal: Moves all extremities, normal  strength, no deformities noted   Skin: pilonidal disease with midline sinus tracts and one slightly off midline with some erythema and hypertrophy. No drainage, no fluctuance or induration  Psychiatric: Mentation appears normal, affect appropriate   Hematologic/Lymphatic/Immunologic: Normal cervical and supraclavicular lymph nodes   Patient able to get up on table without difficulty.    Labs show:  No results found for this or any previous visit (from the past 24 hour(s)).    Imaging shows:  No results found for this or any previous visit (from the past 744 hour(s)).     Assessment:     ICD-10-CM    1. Chronic recurrent pilonidal cyst  L05.91 Adult General Surg Referral     Plan: We discussed the normal disease course of pilonidal disease. This is only the 2nd time ever this has swelled and drained. We discussed surgical excision vs continued observation. At this time after our " discussion he elects to observe. He will call or return should his symptoms worsen.    30 minutes spent on the date of the encounter doing chart review, history and exam, documentation and further activities per the note    Bismark Tirado, DO        Again, thank you for allowing me to participate in the care of your patient.        Sincerely,        Bismark Tirado, DO

## 2022-04-29 NOTE — PROGRESS NOTES
Patient seen in consultation for pilonidal disease    HPI:  Patient is a 36 year old male with history of swelling, pain and subsequent drainage of bloody purulent drainage from gluteal cleft. He had something similar 10+ years ago. Nothing again until now. He was given antibiotics and they have greatly reduced the swelling. It is no longer draining. Does not currently hurt. Does not affect bowels or bladder.    Review Of Systems    Skin: as above  Ears/Nose/Throat: negative  Respiratory: No shortness of breath, dyspnea on exertion, cough, or hemoptysis  Cardiovascular: negative  Gastrointestinal: negative  Genitourinary: negative  Musculoskeletal: negative  Neurologic: negative  Hematologic/Lymphatic/Immunologic: negative  Endocrine: negative      No past medical history on file.    No past surgical history on file.    Family History   Problem Relation Age of Onset     Hypertension Father      Cardiovascular Father         high chol       Social History     Socioeconomic History     Marital status: Single     Spouse name: Not on file     Number of children: Not on file     Years of education: Not on file     Highest education level: Not on file   Occupational History     Not on file   Tobacco Use     Smoking status: Former Smoker     Smokeless tobacco: Never Used   Substance and Sexual Activity     Alcohol use: Yes     Comment: occ     Drug use: No     Sexual activity: Not Currently     Partners: Female   Other Topics Concern     Parent/sibling w/ CABG, MI or angioplasty before 65F 55M? Not Asked   Social History Narrative     Not on file     Social Determinants of Health     Financial Resource Strain: Not on file   Food Insecurity: Not on file   Transportation Needs: Not on file   Physical Activity: Not on file   Stress: Not on file   Social Connections: Not on file   Intimate Partner Violence: Not on file   Housing Stability: Not on file       Current Outpatient Medications   Medication Sig Dispense Refill      "cephALEXin (KEFLEX) 500 MG capsule Take 1 capsule (500 mg) by mouth 4 times daily 40 capsule 1       Medications and history reviewed    Physical exam:  Vitals: /68   Temp (!) 96.2  F (35.7  C) (Temporal)   Ht 1.854 m (6' 1\")   Wt 113.4 kg (250 lb)   BMI 32.98 kg/m    BMI= Body mass index is 32.98 kg/m .    Constitutional: Healthy, alert, non-distressed   Head: Normo-cephalic, atraumatic, no lesions, masses or tenderness   Cardiovascular: RRR, no new murmurs, +S1, +S2 heart sounds, no clicks, rubs or gallops   Respiratory: CTAB, no rales, rhonchi or wheezing, equal chest rise, good respiratory effort   Gastrointestinal: Soft, non-tender, non distended, no rebound rigidity or guarding, no masses or hernias palpated   : Deferred  Musculoskeletal: Moves all extremities, normal  strength, no deformities noted   Skin: pilonidal disease with midline sinus tracts and one slightly off midline with some erythema and hypertrophy. No drainage, no fluctuance or induration  Psychiatric: Mentation appears normal, affect appropriate   Hematologic/Lymphatic/Immunologic: Normal cervical and supraclavicular lymph nodes   Patient able to get up on table without difficulty.    Labs show:  No results found for this or any previous visit (from the past 24 hour(s)).    Imaging shows:  No results found for this or any previous visit (from the past 744 hour(s)).     Assessment:     ICD-10-CM    1. Chronic recurrent pilonidal cyst  L05.91 Adult General Surg Referral     Plan: We discussed the normal disease course of pilonidal disease. This is only the 2nd time ever this has swelled and drained. We discussed surgical excision vs continued observation. At this time after our discussion he elects to observe. He will call or return should his symptoms worsen.    30 minutes spent on the date of the encounter doing chart review, history and exam, documentation and further activities per the note    Bismark Tirado,     "

## 2024-01-16 ENCOUNTER — VIRTUAL VISIT (OUTPATIENT)
Dept: FAMILY MEDICINE | Facility: OTHER | Age: 39
End: 2024-01-16
Payer: COMMERCIAL

## 2024-01-16 DIAGNOSIS — J02.9 SORE THROAT: Primary | ICD-10-CM

## 2024-01-16 LAB
DEPRECATED S PYO AG THROAT QL EIA: NEGATIVE
GROUP A STREP BY PCR: NOT DETECTED

## 2024-01-16 PROCEDURE — 87635 SARS-COV-2 COVID-19 AMP PRB: CPT | Performed by: STUDENT IN AN ORGANIZED HEALTH CARE EDUCATION/TRAINING PROGRAM

## 2024-01-16 PROCEDURE — 87651 STREP A DNA AMP PROBE: CPT | Performed by: STUDENT IN AN ORGANIZED HEALTH CARE EDUCATION/TRAINING PROGRAM

## 2024-01-16 PROCEDURE — 99442 PR PHYSICIAN TELEPHONE EVALUATION 11-20 MIN: CPT | Mod: 93 | Performed by: STUDENT IN AN ORGANIZED HEALTH CARE EDUCATION/TRAINING PROGRAM

## 2024-01-16 RX ORDER — BENZONATATE 100 MG/1
100 CAPSULE ORAL 3 TIMES DAILY PRN
Qty: 30 CAPSULE | Refills: 0 | Status: SHIPPED | OUTPATIENT
Start: 2024-01-16

## 2024-01-16 NOTE — PROGRESS NOTES
"    Instructions Relayed to Patient by Virtual Roomer:     If patient is not active on [facility]:  Relayed the following to patient: \"Would you like us to text or email you a link to join your appointment now or when your provider is ready to initiate the virtual visit?\"    Reminded patient to ensure they were logged on to virtual visit by arrival time listed. Documented in appointment notes if patient had flexibility to initiate visit sooner than arrival time. If pediatric virtual visit, ensured pediatric patient along with parent/guardian will be present for video visit.     Patient offered the website [email or web address] and/or phone number to [facility] Help line: [phone]    Vicente is a 38 year old who is being evaluated via a billable telephone visit.      What phone number would you like to be contacted at? [phone]   How would you like to obtain your AVS? Mail a copy    Distant Location (provider location):  Off-site    Assessment & Plan     Sore throat  Complains of 2 weeks worth of cough that has been fairly stable and responsive well to over-the-counter cough drops.  As of 2 to 3 days ago, worsening and new sore throat as well as some mucus production.  No sick contacts to mention of.  We did discuss about potential etiologies of his symptoms including strep throat, COVID, influenza, possibly even common cold.  He has had strep throat in the past, will rule out strep and COVID as of now, if negative likely viral etiology such as a common cold virus.  Continue with symptomatic care  - Symptomatic COVID-19 Virus (Coronavirus) by PCR  - Streptococcus A Rapid Screen w/Reflex to PCR - Clinic Collect  - benzonatate (TESSALON) 100 MG capsule; Take 1 capsule (100 mg) by mouth 3 times daily as needed for cough      YADIEL BENSON MD  [facility]    Alexandru Zhang is a 38 year old, presenting for the following health issues:  Cough        1/16/2024     2:05 PM "   Additional Questions   Roomed by Jaxon WEBB   Accompanied by Self       History of Present Illness       Reason for visit:  Cough    He eats 2-3 servings of fruits and vegetables daily.He consumes 1 sweetened beverage(s) daily.He exercises with enough effort to increase his heart rate 9 or less minutes per day.  He exercises with enough effort to increase his heart rate 3 or less days per week.        Acute Illness  Acute illness concerns: Cough  Onset/Duration: a week and a half to two weeks   Symptoms:  Fever: No  Chills/Sweats: No  Headache (location?): No  Sinus Pressure: No  Conjunctivitis:  No  Ear Pain: no  Rhinorrhea: YES  Congestion: No  Sore Throat: YES  Cough: YES-non-productive, productive of yellow sputum, waxing and waning over time  Wheeze: YES- in the morning a little bit here and there, once everything is up in the morning then he does not wheeze  Decreased Appetite: YES  Nausea: YES  Vomiting: YES  Diarrhea: No  Dysuria/Freq.: No  Dysuria or Hematuria: No  Fatigue/Achiness: YES- Fatigue  Sick/Strep Exposure: YES- a person he works with is out sick but he had symptoms before she was sick  Therapies tried and outcome: nyquil and dayquil, cough drops        Review of Systems   Constitutional, HEENT, cardiovascular, pulmonary, gi and gu systems are negative, except as otherwise noted.      Objective           Vitals:  No vitals were obtained today due to virtual visit.    Physical Exam   healthy, alert, and no distress  PSYCH: Alert and oriented times 3; coherent speech, normal   rate and volume, able to articulate logical thoughts, able   to abstract reason, no tangential thoughts, no hallucinations   or delusions  His affect is normal  RESP: No cough, no audible wheezing, able to talk in full sentences  Remainder of exam unable to be completed due to telephone visits                Phone call duration: 11 minutes

## 2024-01-17 LAB — SARS-COV-2 RNA RESP QL NAA+PROBE: NEGATIVE

## 2024-01-18 ENCOUNTER — TELEPHONE (OUTPATIENT)
Dept: FAMILY MEDICINE | Facility: OTHER | Age: 39
End: 2024-01-18
Payer: COMMERCIAL

## 2024-01-18 NOTE — RESULT ENCOUNTER NOTE
Team - please call patient with results.  They are negative/normal, nothing else needs to be done at this time with these results unless there are questions or concerns    Thank you,    Russell Morejon MD

## 2024-01-18 NOTE — TELEPHONE ENCOUNTER
----- Message from Alexis Morejon MD sent at 1/18/2024 11:57 AM CST -----  Team - please call patient with results.  They are negative/normal, nothing else needs to be done at this time with these results unless there are questions or concerns    Thank you,    Russell Morejon MD